# Patient Record
Sex: FEMALE
[De-identification: names, ages, dates, MRNs, and addresses within clinical notes are randomized per-mention and may not be internally consistent; named-entity substitution may affect disease eponyms.]

---

## 2024-04-11 ENCOUNTER — RX ONLY (OUTPATIENT)
Age: 54
Setting detail: RX ONLY
End: 2024-04-11

## 2024-07-18 ENCOUNTER — APPOINTMENT (RX ONLY)
Dept: URBAN - METROPOLITAN AREA CLINIC 98 | Facility: CLINIC | Age: 54
Setting detail: DERMATOLOGY
End: 2024-07-18

## 2024-07-18 DIAGNOSIS — Z41.9 ENCOUNTER FOR PROCEDURE FOR PURPOSES OTHER THAN REMEDYING HEALTH STATE, UNSPECIFIED: ICD-10-CM

## 2024-07-18 PROCEDURE — ? ADDITIONAL NOTES

## 2024-07-18 PROCEDURE — ? DVT RISK ASSESSMENT

## 2024-09-12 ENCOUNTER — RX ONLY (OUTPATIENT)
Age: 54
Setting detail: RX ONLY
End: 2024-09-12

## 2024-09-12 ENCOUNTER — APPOINTMENT (RX ONLY)
Dept: URBAN - METROPOLITAN AREA CLINIC 98 | Facility: CLINIC | Age: 54
Setting detail: DERMATOLOGY
End: 2024-09-12

## 2024-09-12 VITALS — HEIGHT: 68 IN

## 2024-09-12 VITALS
HEART RATE: 65 BPM | DIASTOLIC BLOOD PRESSURE: 95 MMHG | WEIGHT: 202 LBS | SYSTOLIC BLOOD PRESSURE: 163 MMHG | HEIGHT: 67.99 IN

## 2024-09-12 DIAGNOSIS — Z41.9 ENCOUNTER FOR PROCEDURE FOR PURPOSES OTHER THAN REMEDYING HEALTH STATE, UNSPECIFIED: ICD-10-CM

## 2024-09-12 PROCEDURE — ? ORDER TESTS

## 2024-09-12 PROCEDURE — ? PRE-OP WORKLIST

## 2024-09-12 RX ORDER — OXYCODONE HYDROCHLORIDE 5 MG/1
TABLET ORAL
Qty: 10 | Refills: 0 | Status: ERX | COMMUNITY
Start: 2024-09-12

## 2024-09-12 RX ORDER — CEPHALEXIN 500 MG/1
1 CAPSULE ORAL QID
Qty: 28 | Refills: 2 | Status: ERX | COMMUNITY
Start: 2024-09-12

## 2024-09-12 RX ORDER — CYCLOBENZAPRINE HYDROCHLORIDE 5 MG/1
1 TABLET, FILM COATED ORAL
Qty: 15 | Refills: 1 | Status: ERX | COMMUNITY
Start: 2024-09-12

## 2024-09-12 RX ORDER — ONDANSETRON 4 MG/1
1 TABLET, FILM COATED ORAL
Qty: 20 | Refills: 1 | Status: ERX | COMMUNITY
Start: 2024-09-12

## 2024-10-03 ENCOUNTER — APPOINTMENT (RX ONLY)
Dept: URBAN - METROPOLITAN AREA CLINIC 98 | Facility: CLINIC | Age: 54
Setting detail: DERMATOLOGY
End: 2024-10-03

## 2024-10-03 ENCOUNTER — APPOINTMENT (OUTPATIENT)
Dept: URBAN - METROPOLITAN AREA CLINIC 101 | Facility: CLINIC | Age: 54
Setting detail: DERMATOLOGY
End: 2024-10-03

## 2024-10-03 DIAGNOSIS — Z41.9 ENCOUNTER FOR PROCEDURE FOR PURPOSES OTHER THAN REMEDYING HEALTH STATE, UNSPECIFIED: ICD-10-CM

## 2024-10-03 PROCEDURE — ? DVT RISK ASSESSMENT

## 2024-10-03 PROCEDURE — ? ELECTROCARDIOGRAM

## 2024-10-23 ENCOUNTER — APPOINTMENT (RX ONLY)
Dept: URBAN - METROPOLITAN AREA CLINIC 101 | Facility: CLINIC | Age: 54
Setting detail: DERMATOLOGY
End: 2024-10-23

## 2024-10-24 ENCOUNTER — APPOINTMENT (RX ONLY)
Dept: URBAN - METROPOLITAN AREA CLINIC 101 | Facility: CLINIC | Age: 54
Setting detail: DERMATOLOGY
End: 2024-10-24

## 2024-10-31 ENCOUNTER — APPOINTMENT (RX ONLY)
Dept: URBAN - METROPOLITAN AREA CLINIC 101 | Facility: CLINIC | Age: 54
Setting detail: DERMATOLOGY
End: 2024-10-31

## 2024-11-21 ENCOUNTER — APPOINTMENT (RX ONLY)
Dept: URBAN - METROPOLITAN AREA CLINIC 101 | Facility: CLINIC | Age: 54
Setting detail: DERMATOLOGY
End: 2024-11-21

## 2025-01-24 ENCOUNTER — APPOINTMENT (OUTPATIENT)
Dept: URBAN - METROPOLITAN AREA CLINIC 101 | Facility: CLINIC | Age: 55
Setting detail: DERMATOLOGY
End: 2025-01-24

## 2025-05-06 NOTE — PROCEDURE: DVT RISK ASSESSMENT
Detail Level: Detailed Physician - Dr Coleman    Type of Procedure/Injection - Cervical Epidural  C7/T1           Laterality - NA      Priority - Normal      Anxiolysis- RNIV      Need to hold medication - No      N/A    None      Clearance needed - No      Follow up - 2 week